# Patient Record
Sex: FEMALE | Race: WHITE | NOT HISPANIC OR LATINO | URBAN - METROPOLITAN AREA
[De-identification: names, ages, dates, MRNs, and addresses within clinical notes are randomized per-mention and may not be internally consistent; named-entity substitution may affect disease eponyms.]

---

## 2023-09-03 PROBLEM — H35.9 RETINA DISORDER: Status: ACTIVE | Noted: 2023-09-03

## 2023-09-03 PROBLEM — E03.9 HYPOTHYROIDISM: Status: ACTIVE | Noted: 2023-09-03

## 2023-09-03 PROBLEM — E88.810 METABOLIC SYNDROME: Status: ACTIVE | Noted: 2023-09-03

## 2023-09-03 PROBLEM — K92.1 BLOOD IN STOOL: Status: ACTIVE | Noted: 2023-09-03

## 2023-09-03 PROBLEM — E55.9 VITAMIN D DEFICIENCY: Status: ACTIVE | Noted: 2023-09-03

## 2023-09-03 PROBLEM — E11.9 NEW ONSET TYPE 2 DIABETES MELLITUS (MULTI): Status: ACTIVE | Noted: 2023-09-03

## 2023-09-03 PROBLEM — C50.912 MALIGNANT NEOPLASM OF UNSPECIFIED SITE OF LEFT FEMALE BREAST (MULTI): Status: ACTIVE | Noted: 2023-09-03

## 2023-09-03 PROBLEM — R10.11 RIGHT UPPER QUADRANT ABDOMINAL PAIN: Status: ACTIVE | Noted: 2023-09-03

## 2023-09-03 PROBLEM — G57.93 NEUROPATHY INVOLVING BOTH LOWER EXTREMITIES: Status: ACTIVE | Noted: 2023-09-03

## 2023-09-03 PROBLEM — R73.9 HYPERGLYCEMIA: Status: ACTIVE | Noted: 2023-09-03

## 2023-09-03 PROBLEM — Z86.0100 HISTORY OF COLONIC POLYPS: Status: ACTIVE | Noted: 2023-09-03

## 2023-09-03 PROBLEM — Z86.010 HISTORY OF COLONIC POLYPS: Status: ACTIVE | Noted: 2023-09-03

## 2023-09-03 PROBLEM — E66.01 MORBID (SEVERE) OBESITY DUE TO EXCESS CALORIES (MULTI): Status: ACTIVE | Noted: 2023-09-03

## 2023-09-03 PROBLEM — N63.20 BREAST MASS, LEFT: Status: ACTIVE | Noted: 2023-09-03

## 2023-09-03 PROBLEM — R30.0 DYSURIA: Status: ACTIVE | Noted: 2023-09-03

## 2023-09-03 RX ORDER — CHOLECALCIFEROL (VITAMIN D3) 50 MCG
1 TABLET ORAL DAILY
COMMUNITY

## 2023-09-03 RX ORDER — LANOLIN ALCOHOL/MO/W.PET/CERES
1 CREAM (GRAM) TOPICAL DAILY
COMMUNITY

## 2023-09-03 RX ORDER — COD LIVER OIL
OIL (ML) ORAL
COMMUNITY

## 2023-09-03 RX ORDER — ASPIRIN 81 MG/1
1 TABLET ORAL 2 TIMES DAILY
COMMUNITY

## 2023-09-03 RX ORDER — PLANT STANOL ESTER 450 MG
TABLET ORAL
COMMUNITY

## 2023-09-03 RX ORDER — PYRIDOXINE HCL (VITAMIN B6) 100 MG
1 TABLET ORAL DAILY
COMMUNITY

## 2023-09-03 RX ORDER — ANASTROZOLE 1 MG/1
1 TABLET ORAL DAILY
COMMUNITY
Start: 2022-01-06

## 2023-09-03 RX ORDER — THYROID 30 MG/1
TABLET ORAL
COMMUNITY

## 2023-09-03 RX ORDER — THYROID 60 MG/1
TABLET ORAL
COMMUNITY

## 2023-09-03 RX ORDER — HYDROCODONE BITARTRATE AND ACETAMINOPHEN 5; 325 MG/1; MG/1
1 TABLET ORAL EVERY 6 HOURS PRN
COMMUNITY

## 2023-12-13 ENCOUNTER — APPOINTMENT (OUTPATIENT)
Dept: PRIMARY CARE | Facility: CLINIC | Age: 63
End: 2023-12-13
Payer: COMMERCIAL

## 2024-01-02 PROBLEM — N63.20 BREAST MASS, LEFT: Status: RESOLVED | Noted: 2023-09-03 | Resolved: 2024-01-02

## 2024-01-02 PROBLEM — R10.11 RIGHT UPPER QUADRANT ABDOMINAL PAIN: Status: RESOLVED | Noted: 2023-09-03 | Resolved: 2024-01-02

## 2024-01-02 PROBLEM — K92.1 BLOOD IN STOOL: Status: RESOLVED | Noted: 2023-09-03 | Resolved: 2024-01-02

## 2024-01-02 PROBLEM — E88.810 METABOLIC SYNDROME: Status: RESOLVED | Noted: 2023-09-03 | Resolved: 2024-01-02

## 2024-01-02 PROBLEM — R73.9 HYPERGLYCEMIA: Status: RESOLVED | Noted: 2023-09-03 | Resolved: 2024-01-02

## 2024-01-02 PROBLEM — R30.0 DYSURIA: Status: RESOLVED | Noted: 2023-09-03 | Resolved: 2024-01-02

## 2024-01-03 ENCOUNTER — APPOINTMENT (OUTPATIENT)
Dept: PRIMARY CARE | Facility: CLINIC | Age: 64
End: 2024-01-03
Payer: COMMERCIAL

## 2024-01-25 ENCOUNTER — APPOINTMENT (OUTPATIENT)
Dept: PRIMARY CARE | Facility: CLINIC | Age: 64
End: 2024-01-25
Payer: COMMERCIAL

## 2025-01-07 ENCOUNTER — LAB (OUTPATIENT)
Dept: LAB | Facility: LAB | Age: 65
End: 2025-01-07
Payer: MEDICARE

## 2025-01-07 DIAGNOSIS — M79.642 PAIN IN LEFT HAND: Primary | ICD-10-CM

## 2025-01-07 LAB
ANION GAP SERPL CALC-SCNC: 16 MMOL/L (ref 10–20)
BUN SERPL-MCNC: 17 MG/DL (ref 6–23)
CALCIUM SERPL-MCNC: 8.8 MG/DL (ref 8.6–10.3)
CHLORIDE SERPL-SCNC: 100 MMOL/L (ref 98–107)
CO2 SERPL-SCNC: 24 MMOL/L (ref 21–32)
CREAT SERPL-MCNC: 0.56 MG/DL (ref 0.5–1.05)
EGFRCR SERPLBLD CKD-EPI 2021: >90 ML/MIN/1.73M*2
GLUCOSE SERPL-MCNC: 265 MG/DL (ref 74–99)
POTASSIUM SERPL-SCNC: 4.4 MMOL/L (ref 3.5–5.3)
SODIUM SERPL-SCNC: 136 MMOL/L (ref 136–145)

## 2025-01-07 PROCEDURE — 80048 BASIC METABOLIC PNL TOTAL CA: CPT

## 2025-01-14 DIAGNOSIS — E03.9 HYPOTHYROIDISM, UNSPECIFIED TYPE: Primary | ICD-10-CM

## 2025-01-14 RX ORDER — THYROID 30 MG/1
30 TABLET ORAL
Qty: 90 TABLET | Refills: 1 | Status: SHIPPED | OUTPATIENT
Start: 2025-01-14

## 2025-01-14 NOTE — TELEPHONE ENCOUNTER
Refill Request: PT IS STATING ARMOUR 60MG IS TOO MUCH OF A DOSE FOR HER.    thyroid, pork, (Tumtum Thyroid) 30 mg tablet 1 tablet on an empty stomach Orally Once a day for 90 day(s)     # 90 DAY SUPPLY    PHARMACY:  [facility]  [address] Phone: [phone]   Fax: [phone]        Next ov-01/17/25, medication follow up.

## 2025-01-17 ENCOUNTER — TELEPHONE (OUTPATIENT)
Dept: PRIMARY CARE | Facility: CLINIC | Age: 65
End: 2025-01-17

## 2025-01-17 ENCOUNTER — OFFICE VISIT (OUTPATIENT)
Dept: PRIMARY CARE | Facility: CLINIC | Age: 65
End: 2025-01-17
Payer: MEDICARE

## 2025-01-17 VITALS
OXYGEN SATURATION: 96 % | BODY MASS INDEX: 46.83 KG/M2 | TEMPERATURE: 98.6 F | DIASTOLIC BLOOD PRESSURE: 74 MMHG | HEART RATE: 79 BPM | SYSTOLIC BLOOD PRESSURE: 128 MMHG | WEIGHT: 239.8 LBS | RESPIRATION RATE: 18 BRPM

## 2025-01-17 DIAGNOSIS — E66.01 MORBID (SEVERE) OBESITY DUE TO EXCESS CALORIES (MULTI): ICD-10-CM

## 2025-01-17 DIAGNOSIS — C50.912 MALIGNANT NEOPLASM OF LEFT BREAST IN FEMALE, ESTROGEN RECEPTOR POSITIVE, UNSPECIFIED SITE OF BREAST: ICD-10-CM

## 2025-01-17 DIAGNOSIS — E03.9 HYPOTHYROIDISM, UNSPECIFIED TYPE: ICD-10-CM

## 2025-01-17 DIAGNOSIS — E55.9 VITAMIN D DEFICIENCY: ICD-10-CM

## 2025-01-17 DIAGNOSIS — Z17.0 MALIGNANT NEOPLASM OF LEFT BREAST IN FEMALE, ESTROGEN RECEPTOR POSITIVE, UNSPECIFIED SITE OF BREAST: ICD-10-CM

## 2025-01-17 DIAGNOSIS — M85.80 OSTEOPENIA DUE TO CANCER THERAPY: ICD-10-CM

## 2025-01-17 DIAGNOSIS — E11.9 TYPE 2 DIABETES MELLITUS TREATED WITHOUT INSULIN (MULTI): Primary | ICD-10-CM

## 2025-01-17 LAB — POC HEMOGLOBIN A1C: 9.8 % (ref 4.2–6.5)

## 2025-01-17 PROCEDURE — 3074F SYST BP LT 130 MM HG: CPT | Performed by: FAMILY MEDICINE

## 2025-01-17 PROCEDURE — 1126F AMNT PAIN NOTED NONE PRSNT: CPT | Performed by: FAMILY MEDICINE

## 2025-01-17 PROCEDURE — 3078F DIAST BP <80 MM HG: CPT | Performed by: FAMILY MEDICINE

## 2025-01-17 PROCEDURE — 99214 OFFICE O/P EST MOD 30 MIN: CPT | Mod: 25 | Performed by: FAMILY MEDICINE

## 2025-01-17 PROCEDURE — 1036F TOBACCO NON-USER: CPT | Performed by: FAMILY MEDICINE

## 2025-01-17 PROCEDURE — 83036 HEMOGLOBIN GLYCOSYLATED A1C: CPT | Mod: QW | Performed by: FAMILY MEDICINE

## 2025-01-17 PROCEDURE — 1159F MED LIST DOCD IN RCRD: CPT | Performed by: FAMILY MEDICINE

## 2025-01-17 PROCEDURE — 1160F RVW MEDS BY RX/DR IN RCRD: CPT | Performed by: FAMILY MEDICINE

## 2025-01-17 PROCEDURE — 99214 OFFICE O/P EST MOD 30 MIN: CPT | Performed by: FAMILY MEDICINE

## 2025-01-17 RX ORDER — METFORMIN HYDROCHLORIDE 500 MG/1
1000 TABLET, EXTENDED RELEASE ORAL
Qty: 200 TABLET | Refills: 1 | Status: SHIPPED | OUTPATIENT
Start: 2025-01-17 | End: 2026-02-21

## 2025-01-17 ASSESSMENT — LIFESTYLE VARIABLES
SKIP TO QUESTIONS 9-10: 1
HOW MANY STANDARD DRINKS CONTAINING ALCOHOL DO YOU HAVE ON A TYPICAL DAY: PATIENT DOES NOT DRINK
AUDIT-C TOTAL SCORE: 0
HOW OFTEN DO YOU HAVE SIX OR MORE DRINKS ON ONE OCCASION: NEVER
HOW OFTEN DO YOU HAVE A DRINK CONTAINING ALCOHOL: NEVER

## 2025-01-17 ASSESSMENT — ENCOUNTER SYMPTOMS
DEPRESSION: 0
LOSS OF SENSATION IN FEET: 0
OCCASIONAL FEELINGS OF UNSTEADINESS: 0

## 2025-01-17 ASSESSMENT — PAIN SCALES - GENERAL: PAINLEVEL_OUTOF10: 0-NO PAIN

## 2025-01-17 ASSESSMENT — PATIENT HEALTH QUESTIONNAIRE - PHQ9
SUM OF ALL RESPONSES TO PHQ9 QUESTIONS 1 & 2: 0
1. LITTLE INTEREST OR PLEASURE IN DOING THINGS: NOT AT ALL
2. FEELING DOWN, DEPRESSED OR HOPELESS: NOT AT ALL

## 2025-01-17 NOTE — PATIENT INSTRUCTIONS
Pt is here for follow up.     For Dm2 - A1c is 9.8 - uncontrolled.  Recommend exercise 30 minutes 3-5 days a week- (walking, jogging biking, swimming resistance training), eating more whole foods (vegetables, fruit, lean proteins), cut out processed foods and added sugar foods.  Recommend starting metformin xr 500mg - use 1 tab a day for 2 weeks, then increase to 2 tabs.  If not controlled we will add either SGLT-2 medication (jardiance, farxiga) or GLP-1 injectable therapy (ozempic/mounjaro)    For thyroid - we will check levels.  Continue armour.      For cholesterol - check levels.     For breast cancer - seeing oncology - up to date on mammogram.     For osteopenia - getting zometa due to arimidex.      Follow up in 3 months.

## 2025-01-17 NOTE — PROGRESS NOTES
Subjective   Patient ID: Mellissa Aguirre is a 64 y.o. female who presents for medication follow up.    Here for follow up.  Patient has been very busy -  lost job and needed aortic valve replacement.      For DM2:  -A1c: 9.8  -Follow up:  Patient has not been able to exercise.  Patient has been unable to work on diet due to stressors.  Glucose worse with eating.   Pt has been getting tenderness in the feet.  Pt was controlling diabetes with her diet.    -Hypoglycemic Episodes: none  -Glucose monitoring:  Checking daily -1-2 times a week    Cancer History:  -Type:  Left breast cancer  -Stage: O3oQ6S5 G2 ER-positive, MS-positive, HER2-negative (low risk Mammaprint) left breast cancer - Dx 2021  -Oncologist: Dr. Sydni Rudolph - [facility]  -F/U: Pt was diagnosed in 2021.  Pt is in remission.  Mammogram yearly currently.  Pt is getting zometa every 6 months.  Pt is on anastrazole - plan is for 5 years.      Hypothyrodism  -Pt has been on armour thyroid    Left thumb pain  -Pt is seeing Dr. Lopez.  Patient has swelling in thumb.  Hx of tumor in the thumb.  Patient is having more discomfort.  Xray neg.  MRI ordered.  Pt has mass on left thumb.          Review of Systems    Objective   Pulse 79   Temp 37 °C (98.6 °F) (Temporal)   Resp 18   Wt 109 kg (239 lb 12.8 oz)   SpO2 96%   BMI 46.83 kg/m²     Physical Exam  Vitals reviewed.   Constitutional:       General: She is not in acute distress.     Appearance: She is obese.   Cardiovascular:      Rate and Rhythm: Normal rate and regular rhythm.   Pulmonary:      Effort: Pulmonary effort is normal.      Breath sounds: No wheezing or rhonchi.   Musculoskeletal:      Right lower leg: No edema.      Left lower leg: No edema.      Comments: Left thumb swelling   Lymphadenopathy:      Cervical: No cervical adenopathy.   Neurological:      Mental Status: She is alert.         Assessment/Plan   Diagnoses and all orders for this visit:  Type 2 diabetes mellitus treated  without insulin (Multi)  -     POCT glycosylated hemoglobin (Hb A1C) manually resulted  Malignant neoplasm of left breast in female, estrogen receptor positive, unspecified site of breast  Morbid (severe) obesity due to excess calories (Multi)         Patient Instructions   Pt is here for follow up.     For Dm2 - A1c is 9.8 - uncontrolled.  Recommend exercise 30 minutes 3-5 days a week- (walking, jogging biking, swimming resistance training), eating more whole foods (vegetables, fruit, lean proteins), cut out processed foods and added sugar foods.  Recommend starting metformin xr 500mg - use 1 tab a day for 2 weeks, then increase to 2 tabs.  If not controleld we will add either SGLT-2 medication (jardiance, farxiga) or GLP-1 injectable therapy (ozempic/mounjaro)    For thyroid - we will check levels.  Continue armour.      For cholesterol - check levels.     For breast cancer - seeing oncology - up to date on mammogram.     For osteopenia - getting zometa due to arimidex.      Follow up in 3 months.

## 2025-01-20 ENCOUNTER — LAB (OUTPATIENT)
Dept: LAB | Facility: LAB | Age: 65
End: 2025-01-20
Payer: MEDICARE

## 2025-01-20 DIAGNOSIS — E03.9 HYPOTHYROIDISM, UNSPECIFIED TYPE: ICD-10-CM

## 2025-01-20 DIAGNOSIS — E11.9 TYPE 2 DIABETES MELLITUS TREATED WITHOUT INSULIN (MULTI): ICD-10-CM

## 2025-01-20 DIAGNOSIS — E55.9 VITAMIN D DEFICIENCY: ICD-10-CM

## 2025-01-20 LAB
25(OH)D3 SERPL-MCNC: 31 NG/ML (ref 30–100)
ALBUMIN SERPL BCP-MCNC: 4.3 G/DL (ref 3.4–5)
ALP SERPL-CCNC: 93 U/L (ref 33–136)
ALT SERPL W P-5'-P-CCNC: 38 U/L (ref 7–45)
ANION GAP SERPL CALC-SCNC: 17 MMOL/L (ref 10–20)
AST SERPL W P-5'-P-CCNC: 27 U/L (ref 9–39)
BASOPHILS # BLD AUTO: 0.07 X10*3/UL (ref 0–0.1)
BASOPHILS NFR BLD AUTO: 0.9 %
BILIRUB SERPL-MCNC: 0.6 MG/DL (ref 0–1.2)
BUN SERPL-MCNC: 16 MG/DL (ref 6–23)
CALCIUM SERPL-MCNC: 9.5 MG/DL (ref 8.6–10.3)
CHLORIDE SERPL-SCNC: 95 MMOL/L (ref 98–107)
CHOLEST SERPL-MCNC: 229 MG/DL (ref 0–199)
CHOLESTEROL/HDL RATIO: 3.8
CO2 SERPL-SCNC: 26 MMOL/L (ref 21–32)
CREAT SERPL-MCNC: 0.64 MG/DL (ref 0.5–1.05)
CREAT UR-MCNC: 88 MG/DL (ref 20–320)
EGFRCR SERPLBLD CKD-EPI 2021: >90 ML/MIN/1.73M*2
EOSINOPHIL # BLD AUTO: 0.41 X10*3/UL (ref 0–0.7)
EOSINOPHIL NFR BLD AUTO: 5.5 %
ERYTHROCYTE [DISTWIDTH] IN BLOOD BY AUTOMATED COUNT: 13.2 % (ref 11.5–14.5)
GLUCOSE SERPL-MCNC: 254 MG/DL (ref 74–99)
HCT VFR BLD AUTO: 46.2 % (ref 36–46)
HDLC SERPL-MCNC: 59.6 MG/DL
HGB BLD-MCNC: 15 G/DL (ref 12–16)
IMM GRANULOCYTES # BLD AUTO: 0.03 X10*3/UL (ref 0–0.7)
IMM GRANULOCYTES NFR BLD AUTO: 0.4 % (ref 0–0.9)
LDLC SERPL CALC-MCNC: 122 MG/DL
LYMPHOCYTES # BLD AUTO: 2.02 X10*3/UL (ref 1.2–4.8)
LYMPHOCYTES NFR BLD AUTO: 27.1 %
MCH RBC QN AUTO: 28.6 PG (ref 26–34)
MCHC RBC AUTO-ENTMCNC: 32.5 G/DL (ref 32–36)
MCV RBC AUTO: 88 FL (ref 80–100)
MICROALBUMIN UR-MCNC: 18.2 MG/L
MICROALBUMIN/CREAT UR: 20.7 UG/MG CREAT
MONOCYTES # BLD AUTO: 0.49 X10*3/UL (ref 0.1–1)
MONOCYTES NFR BLD AUTO: 6.6 %
NEUTROPHILS # BLD AUTO: 4.44 X10*3/UL (ref 1.2–7.7)
NEUTROPHILS NFR BLD AUTO: 59.5 %
NON HDL CHOLESTEROL: 169 MG/DL (ref 0–149)
NRBC BLD-RTO: 0 /100 WBCS (ref 0–0)
PLATELET # BLD AUTO: 224 X10*3/UL (ref 150–450)
POTASSIUM SERPL-SCNC: 4.3 MMOL/L (ref 3.5–5.3)
PROT SERPL-MCNC: 6.8 G/DL (ref 6.4–8.2)
RBC # BLD AUTO: 5.25 X10*6/UL (ref 4–5.2)
SODIUM SERPL-SCNC: 134 MMOL/L (ref 136–145)
T4 FREE SERPL-MCNC: 0.83 NG/DL (ref 0.61–1.12)
TRIGL SERPL-MCNC: 238 MG/DL (ref 0–149)
TSH SERPL-ACNC: 12.27 MIU/L (ref 0.44–3.98)
VLDL: 48 MG/DL (ref 0–40)
WBC # BLD AUTO: 7.5 X10*3/UL (ref 4.4–11.3)

## 2025-01-20 PROCEDURE — 84443 ASSAY THYROID STIM HORMONE: CPT

## 2025-01-20 PROCEDURE — 80061 LIPID PANEL: CPT

## 2025-01-20 PROCEDURE — 80053 COMPREHEN METABOLIC PANEL: CPT

## 2025-01-20 PROCEDURE — 82570 ASSAY OF URINE CREATININE: CPT

## 2025-01-20 PROCEDURE — 84439 ASSAY OF FREE THYROXINE: CPT

## 2025-01-20 PROCEDURE — 82043 UR ALBUMIN QUANTITATIVE: CPT

## 2025-01-20 PROCEDURE — 82306 VITAMIN D 25 HYDROXY: CPT

## 2025-01-20 PROCEDURE — 85025 COMPLETE CBC W/AUTO DIFF WBC: CPT

## 2025-01-21 DIAGNOSIS — E03.9 HYPOTHYROIDISM, UNSPECIFIED TYPE: ICD-10-CM

## 2025-01-21 DIAGNOSIS — E11.9 TYPE 2 DIABETES MELLITUS TREATED WITHOUT INSULIN (MULTI): Primary | ICD-10-CM

## 2025-01-21 DIAGNOSIS — E78.2 MIXED HYPERLIPIDEMIA: ICD-10-CM

## 2025-01-21 RX ORDER — THYROID 15 MG/1
15 TABLET ORAL
Qty: 90 TABLET | Refills: 1 | Status: SHIPPED | OUTPATIENT
Start: 2025-01-21 | End: 2026-01-21

## 2025-01-21 RX ORDER — ROSUVASTATIN CALCIUM 10 MG/1
10 TABLET, COATED ORAL DAILY
Qty: 90 TABLET | Refills: 3 | Status: SHIPPED | OUTPATIENT
Start: 2025-01-21 | End: 2026-01-21

## 2025-02-05 ENCOUNTER — APPOINTMENT (OUTPATIENT)
Facility: HOSPITAL | Age: 65
End: 2025-02-05
Payer: MEDICARE

## 2025-02-19 ENCOUNTER — ANCILLARY PROCEDURE (OUTPATIENT)
Facility: HOSPITAL | Age: 65
End: 2025-02-19
Payer: MEDICARE

## 2025-02-19 DIAGNOSIS — M79.642 PAIN IN LEFT HAND: ICD-10-CM

## 2025-02-19 PROCEDURE — 73220 MRI UPPR EXTREMITY W/O&W/DYE: CPT | Mod: LEFT SIDE | Performed by: STUDENT IN AN ORGANIZED HEALTH CARE EDUCATION/TRAINING PROGRAM

## 2025-02-19 PROCEDURE — A9575 INJ GADOTERATE MEGLUMI 0.1ML: HCPCS | Performed by: ORTHOPAEDIC SURGERY

## 2025-02-19 PROCEDURE — 2550000001 HC RX 255 CONTRASTS: Performed by: ORTHOPAEDIC SURGERY

## 2025-02-19 PROCEDURE — 73220 MRI UPPR EXTREMITY W/O&W/DYE: CPT | Mod: LT

## 2025-02-19 RX ORDER — GADOTERATE MEGLUMINE 376.9 MG/ML
20 INJECTION INTRAVENOUS
Status: COMPLETED | OUTPATIENT
Start: 2025-02-19 | End: 2025-02-19

## 2025-02-19 RX ADMIN — GADOTERATE MEGLUMINE 20 ML: 376.9 INJECTION INTRAVENOUS at 14:31

## 2025-03-07 ENCOUNTER — APPOINTMENT (OUTPATIENT)
Dept: RADIOLOGY | Facility: HOSPITAL | Age: 65
End: 2025-03-07
Payer: MEDICARE

## 2025-03-07 ENCOUNTER — HOSPITAL ENCOUNTER (EMERGENCY)
Facility: HOSPITAL | Age: 65
Discharge: HOME | End: 2025-03-07
Attending: STUDENT IN AN ORGANIZED HEALTH CARE EDUCATION/TRAINING PROGRAM
Payer: MEDICARE

## 2025-03-07 VITALS
DIASTOLIC BLOOD PRESSURE: 80 MMHG | HEART RATE: 81 BPM | RESPIRATION RATE: 18 BRPM | SYSTOLIC BLOOD PRESSURE: 169 MMHG | TEMPERATURE: 98.1 F | WEIGHT: 250 LBS | BODY MASS INDEX: 42.68 KG/M2 | HEIGHT: 64 IN | OXYGEN SATURATION: 95 %

## 2025-03-07 DIAGNOSIS — S16.1XXA CERVICAL STRAIN, ACUTE, INITIAL ENCOUNTER: Primary | ICD-10-CM
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
## 2025-03-07 LAB
ABO GROUP (TYPE) IN BLOOD: NORMAL
ALBUMIN SERPL BCP-MCNC: 4 G/DL (ref 3.4–5)
ALP SERPL-CCNC: 91 U/L (ref 33–136)
ALT SERPL W P-5'-P-CCNC: 64 U/L (ref 7–45)
ANION GAP SERPL CALC-SCNC: 11 MMOL/L (ref 10–20)
ANTIBODY SCREEN: NORMAL
AST SERPL W P-5'-P-CCNC: 55 U/L (ref 9–39)
BASOPHILS # BLD AUTO: 0.07 X10*3/UL (ref 0–0.1)
BASOPHILS NFR BLD AUTO: 0.9 %
BILIRUB SERPL-MCNC: 0.4 MG/DL (ref 0–1.2)
BUN SERPL-MCNC: 18 MG/DL (ref 6–23)
CALCIUM SERPL-MCNC: 8.7 MG/DL (ref 8.6–10.3)
CHLORIDE SERPL-SCNC: 102 MMOL/L (ref 98–107)
CO2 SERPL-SCNC: 23 MMOL/L (ref 21–32)
CREAT SERPL-MCNC: 0.65 MG/DL (ref 0.5–1.05)
EGFRCR SERPLBLD CKD-EPI 2021: >90 ML/MIN/1.73M*2
EOSINOPHIL # BLD AUTO: 0.36 X10*3/UL (ref 0–0.7)
EOSINOPHIL NFR BLD AUTO: 4.5 %
ERYTHROCYTE [DISTWIDTH] IN BLOOD BY AUTOMATED COUNT: 13.2 % (ref 11.5–14.5)
ETHANOL SERPL-MCNC: <10 MG/DL
GLUCOSE SERPL-MCNC: 198 MG/DL (ref 74–99)
HCT VFR BLD AUTO: 40.9 % (ref 36–46)
HGB BLD-MCNC: 13.9 G/DL (ref 12–16)
IMM GRANULOCYTES # BLD AUTO: 0.03 X10*3/UL (ref 0–0.7)
IMM GRANULOCYTES NFR BLD AUTO: 0.4 % (ref 0–0.9)
INR PPP: 1 (ref 0.9–1.1)
LACTATE SERPL-SCNC: 2.4 MMOL/L (ref 0.4–2)
LYMPHOCYTES # BLD AUTO: 1.77 X10*3/UL (ref 1.2–4.8)
LYMPHOCYTES NFR BLD AUTO: 22.1 %
MCH RBC QN AUTO: 28.7 PG (ref 26–34)
MCHC RBC AUTO-ENTMCNC: 34 G/DL (ref 32–36)
MCV RBC AUTO: 84 FL (ref 80–100)
MONOCYTES # BLD AUTO: 0.55 X10*3/UL (ref 0.1–1)
MONOCYTES NFR BLD AUTO: 6.9 %
NEUTROPHILS # BLD AUTO: 5.24 X10*3/UL (ref 1.2–7.7)
NEUTROPHILS NFR BLD AUTO: 65.2 %
NRBC BLD-RTO: 0 /100 WBCS (ref 0–0)
PLATELET # BLD AUTO: 189 X10*3/UL (ref 150–450)
POTASSIUM SERPL-SCNC: 3.7 MMOL/L (ref 3.5–5.3)
PROT SERPL-MCNC: 6.4 G/DL (ref 6.4–8.2)
PROTHROMBIN TIME: 11.5 SECONDS (ref 9.8–12.4)
RBC # BLD AUTO: 4.85 X10*6/UL (ref 4–5.2)
RH FACTOR (ANTIGEN D): NORMAL
SODIUM SERPL-SCNC: 132 MMOL/L (ref 136–145)
WBC # BLD AUTO: 8 X10*3/UL (ref 4.4–11.3)

## 2025-03-07 PROCEDURE — 70450 CT HEAD/BRAIN W/O DYE: CPT

## 2025-03-07 PROCEDURE — 99291 CRITICAL CARE FIRST HOUR: CPT | Performed by: PHYSICIAN ASSISTANT

## 2025-03-07 PROCEDURE — 2550000001 HC RX 255 CONTRASTS: Performed by: STUDENT IN AN ORGANIZED HEALTH CARE EDUCATION/TRAINING PROGRAM

## 2025-03-07 PROCEDURE — 74177 CT ABD & PELVIS W/CONTRAST: CPT | Performed by: STUDENT IN AN ORGANIZED HEALTH CARE EDUCATION/TRAINING PROGRAM

## 2025-03-07 PROCEDURE — 80053 COMPREHEN METABOLIC PANEL: CPT | Performed by: PHYSICIAN ASSISTANT

## 2025-03-07 PROCEDURE — 86900 BLOOD TYPING SEROLOGIC ABO: CPT | Performed by: PHYSICIAN ASSISTANT

## 2025-03-07 PROCEDURE — 82077 ASSAY SPEC XCP UR&BREATH IA: CPT | Performed by: PHYSICIAN ASSISTANT

## 2025-03-07 PROCEDURE — 72131 CT LUMBAR SPINE W/O DYE: CPT | Performed by: STUDENT IN AN ORGANIZED HEALTH CARE EDUCATION/TRAINING PROGRAM

## 2025-03-07 PROCEDURE — 72128 CT CHEST SPINE W/O DYE: CPT

## 2025-03-07 PROCEDURE — 36415 COLL VENOUS BLD VENIPUNCTURE: CPT | Performed by: PHYSICIAN ASSISTANT

## 2025-03-07 PROCEDURE — 74177 CT ABD & PELVIS W/CONTRAST: CPT

## 2025-03-07 PROCEDURE — 85025 COMPLETE CBC W/AUTO DIFF WBC: CPT | Performed by: PHYSICIAN ASSISTANT

## 2025-03-07 PROCEDURE — 86901 BLOOD TYPING SEROLOGIC RH(D): CPT | Performed by: PHYSICIAN ASSISTANT

## 2025-03-07 PROCEDURE — G0390 TRAUMA RESPONS W/HOSP CRITI: HCPCS

## 2025-03-07 PROCEDURE — 72131 CT LUMBAR SPINE W/O DYE: CPT

## 2025-03-07 PROCEDURE — 85610 PROTHROMBIN TIME: CPT | Performed by: PHYSICIAN ASSISTANT

## 2025-03-07 PROCEDURE — 72128 CT CHEST SPINE W/O DYE: CPT | Performed by: STUDENT IN AN ORGANIZED HEALTH CARE EDUCATION/TRAINING PROGRAM

## 2025-03-07 PROCEDURE — 96360 HYDRATION IV INFUSION INIT: CPT | Mod: 59

## 2025-03-07 PROCEDURE — 72125 CT NECK SPINE W/O DYE: CPT | Performed by: STUDENT IN AN ORGANIZED HEALTH CARE EDUCATION/TRAINING PROGRAM

## 2025-03-07 PROCEDURE — 72125 CT NECK SPINE W/O DYE: CPT

## 2025-03-07 PROCEDURE — 83605 ASSAY OF LACTIC ACID: CPT | Performed by: PHYSICIAN ASSISTANT

## 2025-03-07 PROCEDURE — 2500000004 HC RX 250 GENERAL PHARMACY W/ HCPCS (ALT 636 FOR OP/ED): Performed by: STUDENT IN AN ORGANIZED HEALTH CARE EDUCATION/TRAINING PROGRAM

## 2025-03-07 PROCEDURE — 71260 CT THORAX DX C+: CPT | Performed by: STUDENT IN AN ORGANIZED HEALTH CARE EDUCATION/TRAINING PROGRAM

## 2025-03-07 PROCEDURE — 99285 EMERGENCY DEPT VISIT HI MDM: CPT | Mod: 25

## 2025-03-07 PROCEDURE — 70450 CT HEAD/BRAIN W/O DYE: CPT | Performed by: STUDENT IN AN ORGANIZED HEALTH CARE EDUCATION/TRAINING PROGRAM

## 2025-03-07 RX ORDER — TIZANIDINE 2 MG/1
2 TABLET ORAL EVERY 6 HOURS PRN
Qty: 30 TABLET | Refills: 0 | Status: SHIPPED | OUTPATIENT
Start: 2025-03-07 | End: 2025-03-17

## 2025-03-07 RX ADMIN — IOHEXOL 100 ML: 350 INJECTION, SOLUTION INTRAVENOUS at 19:27

## 2025-03-07 RX ADMIN — SODIUM CHLORIDE 500 ML: 0.9 INJECTION, SOLUTION INTRAVENOUS at 20:24

## 2025-03-07 ASSESSMENT — PAIN DESCRIPTION - LOCATION: LOCATION: NECK

## 2025-03-07 ASSESSMENT — COLUMBIA-SUICIDE SEVERITY RATING SCALE - C-SSRS
2. HAVE YOU ACTUALLY HAD ANY THOUGHTS OF KILLING YOURSELF?: NO
1. IN THE PAST MONTH, HAVE YOU WISHED YOU WERE DEAD OR WISHED YOU COULD GO TO SLEEP AND NOT WAKE UP?: NO
6. HAVE YOU EVER DONE ANYTHING, STARTED TO DO ANYTHING, OR PREPARED TO DO ANYTHING TO END YOUR LIFE?: NO

## 2025-03-07 ASSESSMENT — LIFESTYLE VARIABLES
HAVE YOU EVER FELT YOU SHOULD CUT DOWN ON YOUR DRINKING: NO
EVER FELT BAD OR GUILTY ABOUT YOUR DRINKING: NO
HAVE PEOPLE ANNOYED YOU BY CRITICIZING YOUR DRINKING: NO
TOTAL SCORE: 0
EVER HAD A DRINK FIRST THING IN THE MORNING TO STEADY YOUR NERVES TO GET RID OF A HANGOVER: NO

## 2025-03-07 ASSESSMENT — PAIN - FUNCTIONAL ASSESSMENT: PAIN_FUNCTIONAL_ASSESSMENT: 0-10

## 2025-03-07 ASSESSMENT — PAIN SCALES - GENERAL: PAINLEVEL_OUTOF10: 5 - MODERATE PAIN
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
## 2025-04-24 ENCOUNTER — OFFICE VISIT (OUTPATIENT)
Dept: PRIMARY CARE | Facility: CLINIC | Age: 65
End: 2025-04-24
Payer: MEDICARE

## 2025-04-24 VITALS
SYSTOLIC BLOOD PRESSURE: 146 MMHG | DIASTOLIC BLOOD PRESSURE: 84 MMHG | BODY MASS INDEX: 40.37 KG/M2 | WEIGHT: 235.2 LBS | OXYGEN SATURATION: 97 % | HEART RATE: 79 BPM | TEMPERATURE: 97.4 F

## 2025-04-24 DIAGNOSIS — E66.01 MORBID (SEVERE) OBESITY DUE TO EXCESS CALORIES (MULTI): ICD-10-CM

## 2025-04-24 DIAGNOSIS — E03.9 HYPOTHYROIDISM, UNSPECIFIED TYPE: ICD-10-CM

## 2025-04-24 DIAGNOSIS — Z17.0 MALIGNANT NEOPLASM OF LEFT BREAST IN FEMALE, ESTROGEN RECEPTOR POSITIVE, UNSPECIFIED SITE OF BREAST: ICD-10-CM

## 2025-04-24 DIAGNOSIS — C50.912 MALIGNANT NEOPLASM OF LEFT BREAST IN FEMALE, ESTROGEN RECEPTOR POSITIVE, UNSPECIFIED SITE OF BREAST: ICD-10-CM

## 2025-04-24 DIAGNOSIS — E11.9 TYPE 2 DIABETES MELLITUS TREATED WITHOUT INSULIN (MULTI): Primary | ICD-10-CM

## 2025-04-24 LAB
CHOLEST SERPL-MCNC: 219 MG/DL
CHOLEST/HDLC SERPL: 3.8 (CALC)
EST. AVERAGE GLUCOSE BLD GHB EST-MCNC: 275 MG/DL
EST. AVERAGE GLUCOSE BLD GHB EST-SCNC: 15.2 MMOL/L
HBA1C MFR BLD: 11.2 %
HDLC SERPL-MCNC: 57 MG/DL
LDLC SERPL CALC-MCNC: 118 MG/DL (CALC)
NONHDLC SERPL-MCNC: 162 MG/DL (CALC)
TRIGL SERPL-MCNC: 284 MG/DL
TSH SERPL-ACNC: 4.97 MIU/L (ref 0.4–4.5)

## 2025-04-24 PROCEDURE — 1158F ADVNC CARE PLAN TLK DOCD: CPT | Performed by: FAMILY MEDICINE

## 2025-04-24 PROCEDURE — 3049F LDL-C 100-129 MG/DL: CPT | Performed by: FAMILY MEDICINE

## 2025-04-24 PROCEDURE — 3077F SYST BP >= 140 MM HG: CPT | Performed by: FAMILY MEDICINE

## 2025-04-24 PROCEDURE — 3061F NEG MICROALBUMINURIA REV: CPT | Performed by: FAMILY MEDICINE

## 2025-04-24 PROCEDURE — 1159F MED LIST DOCD IN RCRD: CPT | Performed by: FAMILY MEDICINE

## 2025-04-24 PROCEDURE — G2211 COMPLEX E/M VISIT ADD ON: HCPCS | Performed by: FAMILY MEDICINE

## 2025-04-24 PROCEDURE — 99214 OFFICE O/P EST MOD 30 MIN: CPT | Performed by: FAMILY MEDICINE

## 2025-04-24 PROCEDURE — 3079F DIAST BP 80-89 MM HG: CPT | Performed by: FAMILY MEDICINE

## 2025-04-24 PROCEDURE — 1036F TOBACCO NON-USER: CPT | Performed by: FAMILY MEDICINE

## 2025-04-24 PROCEDURE — 1123F ACP DISCUSS/DSCN MKR DOCD: CPT | Performed by: FAMILY MEDICINE

## 2025-04-24 PROCEDURE — 3046F HEMOGLOBIN A1C LEVEL >9.0%: CPT | Performed by: FAMILY MEDICINE

## 2025-04-24 PROCEDURE — 1160F RVW MEDS BY RX/DR IN RCRD: CPT | Performed by: FAMILY MEDICINE

## 2025-04-24 PROCEDURE — 1126F AMNT PAIN NOTED NONE PRSNT: CPT | Performed by: FAMILY MEDICINE

## 2025-04-24 RX ORDER — DULAGLUTIDE 0.75 MG/.5ML
0.75 INJECTION, SOLUTION SUBCUTANEOUS
Qty: 2 ML | Refills: 0 | Status: SHIPPED | OUTPATIENT
Start: 2025-04-27

## 2025-04-24 ASSESSMENT — LIFESTYLE VARIABLES
HOW OFTEN DO YOU HAVE SIX OR MORE DRINKS ON ONE OCCASION: NEVER
AUDIT-C TOTAL SCORE: 0
HOW OFTEN DO YOU HAVE A DRINK CONTAINING ALCOHOL: NEVER
HOW MANY STANDARD DRINKS CONTAINING ALCOHOL DO YOU HAVE ON A TYPICAL DAY: PATIENT DOES NOT DRINK
SKIP TO QUESTIONS 9-10: 1

## 2025-04-24 ASSESSMENT — ENCOUNTER SYMPTOMS
OCCASIONAL FEELINGS OF UNSTEADINESS: 0
DEPRESSION: 0
LOSS OF SENSATION IN FEET: 0

## 2025-04-24 ASSESSMENT — PATIENT HEALTH QUESTIONNAIRE - PHQ9
1. LITTLE INTEREST OR PLEASURE IN DOING THINGS: NOT AT ALL
SUM OF ALL RESPONSES TO PHQ9 QUESTIONS 1 & 2: 0
2. FEELING DOWN, DEPRESSED OR HOPELESS: NOT AT ALL

## 2025-04-24 ASSESSMENT — PAIN SCALES - GENERAL: PAINLEVEL_OUTOF10: 0-NO PAIN

## 2025-04-24 NOTE — PROGRESS NOTES
Subjective   Patient ID: Mellissa Aguirre is a 64 y.o. female who presents for Diabetes.    Patient is here for follow up.     For DM2:  -A1c: 11.2  -Follow up:  Pt tried metformin - pt developed pain in the low back, muscle aches - could not tolerate the double dose - pt has restarted single dose.   -Past Medications: metformin.   -Hypoglycemic Episodes: none  -Glucose monitoring:  Checking daily -1-2 times a week    Cancer History:  -Type:  Left breast cancer  -Stage: V6gF2G5 G2 ER-positive, AK-positive, HER2-negative (low risk Mammaprint) left breast cancer - Dx 2021  -Oncologist: Dr. Sydni Rudolph - [facility]  -F/U: Pt was diagnosed in 2021.  Pt is in remission.  Mammogram yearly currently.  Pt is getting zometa every 6 months.  Pt is on anastrazole - plan is for 5 years.      Hypothyrodism  -Pt has been on armour thyroid    Dyslipidemia  -F/U: Cholesterol is elevated.  Not taking crestor.  Elevated LDL.   -Compliance with treatment thus far has been poor.         Diabetes         Review of Systems    Objective   /84 (BP Location: Right arm, Patient Position: Sitting)   Pulse 79   Temp 36.3 °C (97.4 °F) (Temporal)   Wt 107 kg (235 lb 3.2 oz)   SpO2 97%   BMI 40.37 kg/m²     Physical Exam  Vitals reviewed.   Constitutional:       General: She is not in acute distress.     Appearance: She is obese.   Cardiovascular:      Rate and Rhythm: Normal rate and regular rhythm.   Pulmonary:      Effort: Pulmonary effort is normal.      Breath sounds: No wheezing or rhonchi.   Musculoskeletal:      Right lower leg: No edema.      Left lower leg: No edema.   Lymphadenopathy:      Cervical: No cervical adenopathy.   Neurological:      Mental Status: She is alert.         Assessment/Plan   Diagnoses and all orders for this visit:  Type 2 diabetes mellitus treated without insulin (Multi)  Morbid (severe) obesity due to excess calories (Multi)  Malignant neoplasm of left breast in female, estrogen receptor positive,  unspecified site of breast  Hypothyroidism, unspecified type    Patient Instructions   Here for follow up.     For Diabetes - uncontrolled - A1c is > 11 - goal is < 7%.  Pt had issues with 2 tabs of metformin.  Recommend metformin once a day.   Discussed GLP-1 therapy - recommend trulicity once a week.  Start 0.75mg once a week for 4 weeks, then increase to 1.5mg.  Call if issues.      For thyroid - continue current regimn - we will recheck in 3 months    For cholesterol medication - start crestor once a day.  Recheck in 3 months    Follow up in 3 months.

## 2025-04-24 NOTE — PATIENT INSTRUCTIONS
Here for follow up.     For Diabetes - uncontrolled - A1c is > 11 - goal is < 7%.  Pt had issues with 2 tabs of metformin.  Recommend metformin once a day.   Discussed GLP-1 therapy - recommend trulicity once a week.  Start 0.75mg once a week for 4 weeks, then increase to 1.5mg.  Call if issues.      For thyroid - continue current regimen - we will recheck in 3 months    For cholesterol medication - start crestor once a day.  Recheck in 3 months    Follow up in 3 months.

## 2025-05-01 ENCOUNTER — TELEMEDICINE (OUTPATIENT)
Dept: PHARMACY | Facility: HOSPITAL | Age: 65
End: 2025-05-01
Payer: MEDICARE

## 2025-05-01 DIAGNOSIS — E11.9 TYPE 2 DIABETES MELLITUS TREATED WITHOUT INSULIN (MULTI): ICD-10-CM

## 2025-05-01 NOTE — Clinical Note
Patient did not  Trulicity due to price. Will  and start over the weekend, I will call in a few weeks to help titrate.

## 2025-05-01 NOTE — ASSESSMENT & PLAN NOTE
Patient's goal A1c is < 7%.  Is pt at goal? No, 11.2  Patient's SMBGs are over 200 per patient. Did not  Trulicity due to price. Is willing to  and start this Sunday.  Rationale for plan: Start Trulicity.    Medication Changes:  CONTINUE  Metformin XR 1000 mg once daily  START  Trulicity 0.75 mg once weekly

## 2025-05-01 NOTE — PROGRESS NOTES
"  Clinical Pharmacy Appointment    Patient ID: Mellissa Aguirre is a 64 y.o. female who presents for Diabetes.    Pt is here for First appointment.     Referring Provider/PCP: Hao Benz DO  Last visit with PCP: 4/24/25   Next visit with PCP: 7/25/25    Subjective   Medication Reconciliation:  Changed:   Added:   Discontinued:     Drug Interactions  No relevant drug interactions were noted.    Medication System Management  Patient's preferred pharmacy: [facility]  Adherence/Organization: No issues reported  Affordability/Accessibility: Trulicity $231- Income reported >400% (At least $120,000)    HPI  DIABETES MELLITUS TYPE II:    Diagnosed with diabetes: At least 2019.   Known diabetic complications: None.  Does patient follow with Endocrinology: No  Last optometry exam: 3 years ago  Most recent visit in Podiatry: PCP does     Current diabetic medications include:  Metformin XR 1000 mg once daily    Clarifications to above regimen: Didn't  Trulicity due to price  Adverse Effects: None    Past diabetic medications include:  None    Glucose Readings:  Glucometer/CGM Type: Glucometer  Patient tests BG 1 times per day    Current home BG readings (mg/dL): around 200 fasting, Afternoon 150-180   Previous home BG readings (mg/dL): N/A    Any episodes of hypoglycemia? No,  .  Did patient treat episode of hypoglycemia appropriately? N/A  Does the patient have a prescription for ready-to-use Glucagon? Not on insulin    Lifestyle:  Diet: unknown meals/day.  BK: Not discussed  LN: Not discussed  DN: Not discussed  Snacks: Not discussed  Drinks: \"Im not thirsty, so I don't drink\" 2 bottles of water per day  Exercise: does not exercise  Used to swim but stopped. Just joined  Is limited by: life  Tobacco history: Never    Secondary Prevention:  Statin? Yes  ACE-I/ARB? No  Aspirin? No    Pertinent PMH Review:  PMH of Pancreatitis: No  PMH of Retinopathy: No  PMH of Urinary Tract Infections: No  PMH of MTC: No  PMH of " MEN2: No  UACR/EGFR in last year?: Yes  Albumin/Creatinine Ratio   Date Value Ref Range Status   01/20/2025 20.7 <30.0 ug/mg Creat Final       Immunizations:  Influenza? No  COVID? No  Pneumonia? No  Shingles? No  Hepatitis B? No     Objective   Allergies[1]  Social History     Social History Narrative    Not on file      Medication Review  Current Outpatient Medications   Medication Instructions    anastrozole (Arimidex) 1 mg tablet 1 tablet, Daily    cholecalciferol (Vitamin D-3) 50 MCG (2000 UT) tablet 1 tablet, Daily    cod liver oiL oil Take by mouth.    cyanocobalamin (Vitamin B-12) 1,000 mcg tablet 1 tablet, Daily    metFORMIN XR (GLUCOPHAGE-XR) 1,000 mg, oral, Daily with breakfast, Do not crush, chew, or split.    rosuvastatin (CRESTOR) 10 mg, oral, Daily    thyroid (pork) (ARMOUR THYROID) 30 mg, oral, Daily before breakfast    thyroid (pork) (ARMOUR THYROID) 15 mg, oral, Daily before breakfast    Trulicity 0.75 mg, subcutaneous, Once Weekly    zoledronic acid (ZOMETA IV) Infuse into a venous catheter.      Vitals  BP Readings from Last 2 Encounters:   04/24/25 146/84   03/07/25 169/80     BMI Readings from Last 1 Encounters:   04/24/25 40.37 kg/m²      Labs  A1C  Lab Results   Component Value Date    HGBA1C 11.2 (H) 04/23/2025    HGBA1C 9.8 (A) 01/17/2025    HGBA1C 6.5 (H) 09/10/2020     BMP  Lab Results   Component Value Date    CALCIUM 8.7 03/07/2025     (L) 03/07/2025    K 3.7 03/07/2025    CO2 23 03/07/2025     03/07/2025    BUN 18 03/07/2025    CREATININE 0.65 03/07/2025    EGFR >90 03/07/2025     LFTs  Lab Results   Component Value Date    ALT 64 (H) 03/07/2025    AST 55 (H) 03/07/2025    ALKPHOS 91 03/07/2025    BILITOT 0.4 03/07/2025     FLP  Lab Results   Component Value Date    TRIG 284 (H) 04/23/2025    CHOL 219 (H) 04/23/2025    LDLCALC 118 (H) 04/23/2025    HDL 57 04/23/2025     Urine Microalbumin  Lab Results   Component Value Date    MICROALBCREA 20.7 01/20/2025     Weight  Management  Wt Readings from Last 3 Encounters:   04/24/25 107 kg (235 lb 3.2 oz)   03/07/25 113 kg (250 lb)   01/17/25 109 kg (239 lb 12.8 oz)      There is no height or weight on file to calculate BMI.     Assessment/Plan   Problem List Items Addressed This Visit       Type 2 diabetes mellitus treated without insulin (Multi)    Patient's goal A1c is < 7%.  Is pt at goal? No, 11.2  Patient's SMBGs are over 200 per patient. Did not  Trulicity due to price. Is willing to  and start this Sunday.  Rationale for plan: Start Trulicity.    Medication Changes:  CONTINUE  Metformin XR 1000 mg once daily  START  Trulicity 0.75 mg once weekly         Relevant Orders    Referral to Clinical Pharmacy       Clinical Pharmacist follow-up: 5/22 at 11 am, Telehealth visit    Patient is not followed in [facility].    Continue all meds under the continuation of care with the referring provider and clinical pharmacy team.    Thank you,  Tamara Ramirez  Clinical Pharmacy Specialist, Primary Care  Phone: [phone]  Fax: [phone]    Verbal consent to manage patient's drug therapy was obtained from the patient. They were informed they may decline to participate or withdraw from participation in pharmacy services at any time.          [1]   Allergies  Allergen Reactions    Ciprofloxacin Other, Hallucinations and Unknown     Confusion (other)    Nsaids (Non-Steroidal Anti-Inflammatory Drug) GI bleeding     Contraindicated

## 2025-05-22 ENCOUNTER — APPOINTMENT (OUTPATIENT)
Dept: PHARMACY | Facility: HOSPITAL | Age: 65
End: 2025-05-22
Payer: MEDICARE

## 2025-05-22 DIAGNOSIS — E11.9 TYPE 2 DIABETES MELLITUS TREATED WITHOUT INSULIN (MULTI): ICD-10-CM

## 2025-05-22 RX ORDER — DULAGLUTIDE 0.75 MG/.5ML
0.75 INJECTION, SOLUTION SUBCUTANEOUS
Qty: 2 ML | Refills: 0 | Status: SHIPPED | OUTPATIENT
Start: 2025-05-25

## 2025-05-22 NOTE — PROGRESS NOTES
Clinical Pharmacy Appointment    Patient ID: Mellissa Aguirre is a 64 y.o. female who presents for Diabetes.      Referring Provider/PCP: Hao Benz DO  Last visit with PCP: 4/24/25   Next visit with PCP: 7/25/25    Subjective   Interval history:  Since last visit, started Trulicity 0.75mg weekly  5 lb weight loss in past 3 weeks   She stopped metformin 1 week ago due to recurrence of joint pain and stiffness with resuming therapy. She never started statin - prefers to wait until a few months on Trulicity and repeat lipid panel.  Watching carb intake more and effect on glucose - limiting pasta   Mild nausea after dose other denies GI side effects. Normal bowel movements. At baseline had loose stool with lactose products  Energy improved, sleeping better    DIABETES MELLITUS TYPE II:    Diagnosed with diabetes: At least 2019.   Known diabetic complications: None.  Does patient follow with Endocrinology: No  Last optometry exam: 3 years ago  Most recent visit in Podiatry: PCP does     Current diabetic medications include:  Metformin XR 1000 mg once daily - self stopped  Trulicity 0.75mg once weekly (Sunday)    Clarifications to above regimen: S/e at higher metformin dosing  Adverse Effects: None    Past diabetic medications include:  None    Glucose Readings:  Glucometer/CGM Type: Glucometer  Patient tests BG 1 times per day    Current home BG readings (mg/dL): 110-117 postprandial, AM -188  Previous home BG readings (mg/dL): 220-260 AM FBG, Afternoon 150-180     Any episodes of hypoglycemia? No,  .  Did patient treat episode of hypoglycemia appropriately? N/A  Does the patient have a prescription for ready-to-use Glucagon? Not on insulin    Lifestyle:  Diet: 1 large meal per day at baseline - since starting Trulicity started having small meal in morning   BK: Rg and egg cheese biscuit  LN: skips or small snack like beefstick   DN: meat and veg   Snacks: carrots, celery, radish with ranch, or  nuts   Drinks: 2 bottles of water per day  Exercise: does not exercise  Used to swim but stopped. Just joined  Tobacco history: Never    Secondary Prevention:  Statin? No - never started statin   ACE-I/ARB? No  Aspirin? No    Pertinent PMH Review:  PMH of Pancreatitis: No  PMH of Retinopathy: No  PMH of Urinary Tract Infections: No  PMH of MTC: No  PMH of MEN2: No  UACR/EGFR in last year?: Yes  Albumin/Creatinine Ratio   Date Value Ref Range Status   01/20/2025 20.7 <30.0 ug/mg Creat Final       Immunizations:  Influenza? No  COVID? No  Pneumonia? No  Shingles? No  Hepatitis B? No     Medication Reconciliation:  Changed:   Added:   Discontinued:     Drug Interactions  No relevant drug interactions were noted.    Medication System Management  Patient's preferred pharmacy: [facility]  Adherence/Organization: No issues reported  Affordability/Accessibility: Trulicity $231- Ineligible for  PAP due to income reported >400%    Objective   Allergies[1]  Social History     Social History Narrative    Not on file      Medication Review  Current Outpatient Medications   Medication Instructions    anastrozole (Arimidex) 1 mg tablet 1 tablet, Daily    cholecalciferol (Vitamin D-3) 50 MCG (2000 UT) tablet 1 tablet, Daily    cod liver oiL oil Take by mouth.    cyanocobalamin (Vitamin B-12) 1,000 mcg tablet 1 tablet, Daily    rosuvastatin (CRESTOR) 10 mg, oral, Daily    thyroid (pork) (ARMOUR THYROID) 30 mg, oral, Daily before breakfast    thyroid (pork) (ARMOUR THYROID) 15 mg, oral, Daily before breakfast    [START ON 5/25/2025] Trulicity 0.75 mg, subcutaneous, Once Weekly    zoledronic acid (ZOMETA IV) Infuse into a venous catheter.      Vitals  BP Readings from Last 2 Encounters:   04/24/25 146/84   03/07/25 169/80     BMI Readings from Last 1 Encounters:   04/24/25 40.37 kg/m²      Labs  Lab Results   Component Value Date    HGBA1C 11.2 (H) 04/23/2025    HGBA1C 9.8 (A) 01/17/2025    HGBA1C 6.5 (H) 09/10/2020     Lab Results    Component Value Date    CALCIUM 8.7 03/07/2025     (L) 03/07/2025    K 3.7 03/07/2025    CO2 23 03/07/2025     03/07/2025    BUN 18 03/07/2025    CREATININE 0.65 03/07/2025    EGFR >90 03/07/2025     Lab Results   Component Value Date    ALT 64 (H) 03/07/2025    AST 55 (H) 03/07/2025    ALKPHOS 91 03/07/2025    BILITOT 0.4 03/07/2025     Lab Results   Component Value Date    TRIG 284 (H) 04/23/2025    CHOL 219 (H) 04/23/2025    LDLCALC 118 (H) 04/23/2025    HDL 57 04/23/2025     Lab Results   Component Value Date    MICROALBCREA 20.7 01/20/2025     Wt Readings from Last 3 Encounters:   04/24/25 107 kg (235 lb 3.2 oz)   03/07/25 113 kg (250 lb)   01/17/25 109 kg (239 lb 12.8 oz)      There is no height or weight on file to calculate BMI.     Assessment/Plan   Problem List Items Addressed This Visit       Type 2 diabetes mellitus treated without insulin (Multi)    Relevant Medications    dulaglutide (Trulicity) 0.75 mg/0.5 mL pen injector (Start on 5/25/2025)    Other Relevant Orders    Referral to Clinical Pharmacy     Patient's goal A1c is < 7%.  Is pt at goal? No, 11.2%  Patient's SMBGs are improving.       Medication Changes:  CONTINUE  Trulicity 0.75mg subcutaneous once weekly   Adequate glycemic, weight loss response and tolerance - prefers to maintain dosing for additional month   Recent implementation of diet changes- reducing carb intake, increasing protein   STOP  Metformin XR (patient stopped due to persistent joint side effects)    Future Considerations:  Recommended podiatry and eye exam - she plans to schedule, cost barrier previously   LDL > goal of 70, Statin recommended - patient prefers to trial Trulicity and lifestyle changes for a few months and repeat lipid panel     Monitoring and Education:  Counseled patient on Trulicity MOA, expectations, side effects, duration of therapy, administration, and monitoring parameters.  Provided detailed dosing and administration counseling to ensure  proper technique.   Reviewed Trulicity titration schedule, starting with 0.75 mg once weekly to 1.5 mg, 3 mg, and if tolerated 4.5 mg.  Counseled patient on the benefits of GLP-1ra, such as cardiovascular risk reduction, glycemic control, and weight loss potential.  Reviewed storage requirements of Trulicity when not in use, and when to administer the medication if a dose is missed.  Advised patient that they may experience improved satiety after meals and portion sizes of meals may be reduced as doses of Trulicity increase.     Clinical Pharmacist follow-up: 6/19 11 AM, Telehealth visit    Patient is not followed in [facility].    Continue all meds under the continuation of care with the referring provider and clinical pharmacy team.    Thank you,  Kimmy Mensah, PharmD  Clinical Pharmacy Specialist Primary Care  [phone]     Verbal consent to manage patient's drug therapy was obtained from the patient. They were informed they may decline to participate or withdraw from participation in pharmacy services at any time.          [1]   Allergies  Allergen Reactions    Ciprofloxacin Other, Hallucinations and Unknown     Confusion (other)    Nsaids (Non-Steroidal Anti-Inflammatory Drug) GI bleeding     Contraindicated

## 2025-06-19 ENCOUNTER — APPOINTMENT (OUTPATIENT)
Dept: PHARMACY | Facility: HOSPITAL | Age: 65
End: 2025-06-19
Payer: MEDICARE

## 2025-06-19 DIAGNOSIS — E11.9 TYPE 2 DIABETES MELLITUS TREATED WITHOUT INSULIN (MULTI): ICD-10-CM

## 2025-06-19 RX ORDER — DULAGLUTIDE 1.5 MG/.5ML
1.5 INJECTION, SOLUTION SUBCUTANEOUS WEEKLY
Qty: 2 ML | Refills: 1 | Status: SHIPPED | OUTPATIENT
Start: 2025-06-19
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
## 2025-07-07 ENCOUNTER — TELEMEDICINE (OUTPATIENT)
Dept: PHARMACY | Facility: HOSPITAL | Age: 65
End: 2025-07-07
Payer: MEDICARE

## 2025-07-07 ENCOUNTER — TELEPHONE (OUTPATIENT)
Dept: PRIMARY CARE | Facility: CLINIC | Age: 65
End: 2025-07-07
Payer: MEDICARE

## 2025-07-07 DIAGNOSIS — E11.9 TYPE 2 DIABETES MELLITUS TREATED WITHOUT INSULIN (MULTI): Primary | ICD-10-CM

## 2025-07-07 DIAGNOSIS — E11.9 TYPE 2 DIABETES MELLITUS TREATED WITHOUT INSULIN (MULTI): ICD-10-CM

## 2025-07-07 RX ORDER — DULAGLUTIDE 0.75 MG/.5ML
0.75 INJECTION, SOLUTION SUBCUTANEOUS
Qty: 2 ML | Refills: 0 | Status: SHIPPED | OUTPATIENT
Start: 2025-07-13
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
## 2025-07-07 NOTE — TELEPHONE ENCOUNTER
Patient dosage for trulicity was increased to 1.5mg from 0.75mg last week. Patient states that she experienced intense back pain after taking this higher dose and it increased her BGL. Patient wants to go back down to 0.75mg dosage. She said that she tried to call clinical pharmacy back and they told her that she had the wrong number. Patient was due for meds yesterday.    [facility]

## 2025-07-17 ENCOUNTER — APPOINTMENT (OUTPATIENT)
Dept: PHARMACY | Facility: HOSPITAL | Age: 65
End: 2025-07-17
Payer: MEDICARE

## 2025-07-25 ENCOUNTER — PATIENT MESSAGE (OUTPATIENT)
Dept: PRIMARY CARE | Facility: CLINIC | Age: 65
End: 2025-07-25
Payer: MEDICARE

## 2025-07-25 ENCOUNTER — APPOINTMENT (OUTPATIENT)
Dept: PRIMARY CARE | Facility: CLINIC | Age: 65
End: 2025-07-25
Payer: MEDICARE

## 2025-07-25 LAB
ALBUMIN SERPL-MCNC: 4.4 G/DL (ref 3.6–5.1)
ALP SERPL-CCNC: 100 U/L (ref 37–153)
ALT SERPL-CCNC: 36 U/L (ref 6–29)
ANION GAP SERPL CALCULATED.4IONS-SCNC: 12 MMOL/L (CALC) (ref 7–17)
AST SERPL-CCNC: 24 U/L (ref 10–35)
BILIRUB SERPL-MCNC: 0.6 MG/DL (ref 0.2–1.2)
BUN SERPL-MCNC: 17 MG/DL (ref 7–25)
CALCIUM SERPL-MCNC: 9.4 MG/DL (ref 8.6–10.4)
CHLORIDE SERPL-SCNC: 102 MMOL/L (ref 98–110)
CHOLEST SERPL-MCNC: 227 MG/DL
CHOLEST/HDLC SERPL: 4.1 (CALC)
CO2 SERPL-SCNC: 24 MMOL/L (ref 20–32)
CREAT SERPL-MCNC: 0.61 MG/DL (ref 0.5–1.05)
EGFRCR SERPLBLD CKD-EPI 2021: 99 ML/MIN/1.73M2
EST. AVERAGE GLUCOSE BLD GHB EST-MCNC: 246 MG/DL
EST. AVERAGE GLUCOSE BLD GHB EST-SCNC: 13.6 MMOL/L
GLUCOSE SERPL-MCNC: 212 MG/DL (ref 65–99)
HBA1C MFR BLD: 10.2 %
HDLC SERPL-MCNC: 56 MG/DL
LDLC SERPL CALC-MCNC: 127 MG/DL (CALC)
NONHDLC SERPL-MCNC: 171 MG/DL (CALC)
POTASSIUM SERPL-SCNC: 4.5 MMOL/L (ref 3.5–5.3)
PROT SERPL-MCNC: 6.8 G/DL (ref 6.1–8.1)
SODIUM SERPL-SCNC: 138 MMOL/L (ref 135–146)
T4 FREE SERPL-MCNC: 1 NG/DL (ref 0.8–1.8)
TRIGL SERPL-MCNC: 288 MG/DL
TSH SERPL-ACNC: 5.33 MIU/L (ref 0.4–4.5)

## 2025-08-02 DIAGNOSIS — E11.9 TYPE 2 DIABETES MELLITUS TREATED WITHOUT INSULIN (MULTI): ICD-10-CM

## 2025-08-02 RX ORDER — DULAGLUTIDE 0.75 MG/.5ML
INJECTION, SOLUTION SUBCUTANEOUS
Qty: 2 ML | Refills: 0 | Status: SHIPPED | OUTPATIENT
Start: 2025-08-02

## 2025-08-07 ENCOUNTER — APPOINTMENT (OUTPATIENT)
Dept: PHARMACY | Facility: HOSPITAL | Age: 65
End: 2025-08-07
Payer: MEDICARE

## 2025-08-07 DIAGNOSIS — E11.9 TYPE 2 DIABETES MELLITUS TREATED WITHOUT INSULIN (MULTI): Primary | ICD-10-CM

## 2025-08-07 NOTE — PROGRESS NOTES
Clinical Pharmacy Appointment    Patient ID: Mellissa Aguirre is a 64 y.o. female who presents for No chief complaint on file..      Referring Provider/PCP: Hao Benz,   Last visit with PCP: 4/24/25   Next visit with PCP: 8/12/25    Subjective   Interval history:  Since last visit, repeat A1c 10.2% (from 11.2%). Trulicity dose was reduced to 0.75mg weekly due to severe back pain after one dose of Trulicity 1.5mg. States back pain developed while on metformin in past as well. Back pain has resolved. Hesistant to make any med changes due to med sensitivities. States yesterday has felt the best she has in a long time.     DIABETES MELLITUS TYPE II:    Diagnosed with diabetes: At least 2019.   Known diabetic complications: None.  Does patient follow with Endocrinology: No  Last optometry exam: 3 years ago  Most recent visit in Podiatry: PCP does     Current diabetic medications include:  Trulicity 0.75mg once weekly (Sunday)    Adverse Effects: back pain     Past diabetic medications include:  Metformin XR 1000 mg once daily - self stopped due to joint pain    Glucose Readings:  Glucometer/CGM Type: Glucometer  Patient tests BG 1 times per day    Current home BG readings (mg/dL): AM -180    Previous home BG readings (mg/dL): AM -200    Any episodes of hypoglycemia? No,  .  Did patient treat episode of hypoglycemia appropriately? N/A  Does the patient have a prescription for ready-to-use Glucagon? Not on insulin    Lifestyle:  Diet: 1 large meal per day at baseline - since starting Trulicity started having small meal in morning   BK: Rg and egg cheese biscuit  LN: skips or small snack like beefstick   DN: meat and veg   Snacks: carrots, celery, radish with ranch, or nuts   Drinks: 2 bottles of water per day  Exercise: does not exercise  Used to swim but stopped. Just joined  Tobacco history: Never    Secondary Prevention:  Statin? No - never started statin   ACE-I/ARB? No  Aspirin?  No    Pertinent PMH Review:  PMH of Pancreatitis: No  PMH of Retinopathy: No  PMH of Urinary Tract Infections: No  PMH of MTC: No  PMH of MEN2: No  UACR/EGFR in last year?: Yes  Albumin/Creatinine Ratio   Date Value Ref Range Status   01/20/2025 20.7 <30.0 ug/mg Creat Final       Immunizations:  Influenza? No  COVID? No  Pneumonia? No  Shingles? No  Hepatitis B? No     Medication Reconciliation:  Changed:   Added:   Discontinued:     Drug Interactions  No relevant drug interactions were noted.    Medication System Management  Patient's preferred pharmacy: [facility]  Adherence/Organization: No issues reported  Affordability/Accessibility: Trulicity $231- Ineligible for  PAP due to income reported >400%    Objective   Allergies[1]  Social History     Social History Narrative    Not on file      Medication Review  Current Outpatient Medications   Medication Instructions    anastrozole (Arimidex) 1 mg tablet 1 tablet, Daily    cholecalciferol (Vitamin D-3) 50 MCG (2000 UT) tablet 1 tablet, Daily    cod liver oiL oil Take by mouth.    cyanocobalamin (Vitamin B-12) 1,000 mcg tablet 1 tablet, Daily    rosuvastatin (CRESTOR) 10 mg, oral, Daily    thyroid (pork) (ARMOUR THYROID) 30 mg, oral, Daily before breakfast    thyroid (pork) (ARMOUR THYROID) 15 mg, oral, Daily before breakfast    Trulicity 0.75 mg/0.5 mL pen injector INJECT 0.75MG UNDER THE SKIN 1 TIME PER WEEK    zoledronic acid (ZOMETA IV) Infuse into a venous catheter.      Vitals  BP Readings from Last 2 Encounters:   04/24/25 146/84   03/07/25 169/80     BMI Readings from Last 1 Encounters:   04/24/25 40.37 kg/m²      Labs  Lab Results   Component Value Date    HGBA1C 10.2 (H) 07/24/2025    HGBA1C 11.2 (H) 04/23/2025    HGBA1C 9.8 (A) 01/17/2025     Lab Results   Component Value Date    CALCIUM 9.4 07/24/2025     07/24/2025    K 4.5 07/24/2025    CO2 24 07/24/2025     07/24/2025    BUN 17 07/24/2025    CREATININE 0.61 07/24/2025    EGFR 99 07/24/2025      Lab Results   Component Value Date    ALT 36 (H) 07/24/2025    AST 24 07/24/2025    ALKPHOS 100 07/24/2025    BILITOT 0.6 07/24/2025     Lab Results   Component Value Date    TRIG 288 (H) 07/24/2025    CHOL 227 (H) 07/24/2025    LDLCALC 127 (H) 07/24/2025    HDL 56 07/24/2025     Lab Results   Component Value Date    MICROALBCREA 20.7 01/20/2025     Wt Readings from Last 3 Encounters:   04/24/25 107 kg (235 lb 3.2 oz)   03/07/25 113 kg (250 lb)   01/17/25 109 kg (239 lb 12.8 oz)      There is no height or weight on file to calculate BMI.     Assessment/Plan   Problem List Items Addressed This Visit    None        Patient's goal A1c is < 7%.  Is pt at goal? No, 10.2%  Patient's SMBGs are improving however remain above goal. She is hesitant to make medication changes at this time and prefers lifestyle measures. However she acknowledges that if blood sugars remain elevated she will need additional therapy.    Medication Changes:  CONTINUE  Trulicity 0.75 mg subcutaneous once weekly   Great work on diet changes so far. Continue work on reducing simple carbs, increasing protein and meal prepping to save time later in the week when schedule is busy.    Future Considerations:  Due to side effects limiting Trulicity dose escalation, discussed trying alternate such as Mounjaro. She prefers to continue with Trulicity for now and will re-evaluate other options pending PCP follow up in a couple weeks.  Recommended podiatry and eye exam - she plans to schedule, cost barrier previously   LDL > goal of 70, Statin recommended - patient prefers to trial Trulicity and lifestyle changes for a few months and repeat lipid panel     Monitoring and Education:  Counseled patient on Trulicity MOA, expectations, side effects, duration of therapy, administration, and monitoring parameters.  Provided detailed dosing and administration counseling to ensure proper technique.   Reviewed Trulicity titration schedule, starting with 0.75 mg once  weekly to 1.5 mg, 3 mg, and if tolerated 4.5 mg.  Counseled patient on the benefits of GLP-1ra, such as cardiovascular risk reduction, glycemic control, and weight loss potential.  Reviewed storage requirements of Trulicity when not in use, and when to administer the medication if a dose is missed.  Advised patient that they may experience improved satiety after meals and portion sizes of meals may be reduced as doses of Trulicity increase.     Blood sugar goals  - Fastin - 130 mg/dL  - 2 hours after meal: less than 180 mg/dL  - A1c: less than 7%     Clinical Pharmacist follow-up:  11 AM, Telehealth visit  Patient is not followed in [facility].    Continue all meds under the continuation of care with the referring provider and clinical pharmacy team.    Thank you,  Kimmy Mensah, PharmD  Clinical Pharmacy Specialist Primary Care  [phone]     Verbal consent to manage patient's drug therapy was obtained from the patient. They were informed they may decline to participate or withdraw from participation in pharmacy services at any time.          [1]   Allergies  Allergen Reactions    Ciprofloxacin Other, Hallucinations and Unknown     Confusion (other)    Nsaids (Non-Steroidal Anti-Inflammatory Drug) GI bleeding     Contraindicated

## 2025-08-12 ENCOUNTER — OFFICE VISIT (OUTPATIENT)
Dept: PRIMARY CARE | Facility: CLINIC | Age: 65
End: 2025-08-12
Payer: MEDICARE

## 2025-08-12 VITALS
BODY MASS INDEX: 39.51 KG/M2 | OXYGEN SATURATION: 96 % | SYSTOLIC BLOOD PRESSURE: 158 MMHG | DIASTOLIC BLOOD PRESSURE: 92 MMHG | TEMPERATURE: 97.8 F | HEART RATE: 79 BPM | WEIGHT: 230.2 LBS

## 2025-08-12 DIAGNOSIS — C50.912 MALIGNANT NEOPLASM OF LEFT FEMALE BREAST, UNSPECIFIED ESTROGEN RECEPTOR STATUS, UNSPECIFIED SITE OF BREAST: ICD-10-CM

## 2025-08-12 DIAGNOSIS — E78.2 MIXED HYPERLIPIDEMIA: ICD-10-CM

## 2025-08-12 DIAGNOSIS — E11.9 TYPE 2 DIABETES MELLITUS TREATED WITHOUT INSULIN (MULTI): Primary | ICD-10-CM

## 2025-08-12 DIAGNOSIS — E03.9 HYPOTHYROIDISM, UNSPECIFIED TYPE: ICD-10-CM

## 2025-08-12 PROCEDURE — 99214 OFFICE O/P EST MOD 30 MIN: CPT | Performed by: FAMILY MEDICINE

## 2025-08-12 PROCEDURE — 1126F AMNT PAIN NOTED NONE PRSNT: CPT | Performed by: FAMILY MEDICINE

## 2025-08-12 PROCEDURE — G2211 COMPLEX E/M VISIT ADD ON: HCPCS | Performed by: FAMILY MEDICINE

## 2025-08-12 PROCEDURE — 3080F DIAST BP >= 90 MM HG: CPT | Performed by: FAMILY MEDICINE

## 2025-08-12 PROCEDURE — 1036F TOBACCO NON-USER: CPT | Performed by: FAMILY MEDICINE

## 2025-08-12 PROCEDURE — 3077F SYST BP >= 140 MM HG: CPT | Performed by: FAMILY MEDICINE

## 2025-08-12 PROCEDURE — 3046F HEMOGLOBIN A1C LEVEL >9.0%: CPT | Performed by: FAMILY MEDICINE

## 2025-08-12 PROCEDURE — 1160F RVW MEDS BY RX/DR IN RCRD: CPT | Performed by: FAMILY MEDICINE

## 2025-08-12 PROCEDURE — 1159F MED LIST DOCD IN RCRD: CPT | Performed by: FAMILY MEDICINE

## 2025-08-12 RX ORDER — THYROID 15 MG/1
15 TABLET ORAL
Qty: 90 TABLET | Refills: 1 | Status: SHIPPED | OUTPATIENT
Start: 2025-08-12 | End: 2026-08-12

## 2025-08-12 RX ORDER — THYROID 30 MG/1
30 TABLET ORAL
Qty: 90 TABLET | Refills: 1 | Status: SHIPPED | OUTPATIENT
Start: 2025-08-12

## 2025-08-12 RX ORDER — METFORMIN HYDROCHLORIDE 500 MG/1
500 TABLET, EXTENDED RELEASE ORAL
COMMUNITY

## 2025-08-12 ASSESSMENT — LIFESTYLE VARIABLES
HOW MANY STANDARD DRINKS CONTAINING ALCOHOL DO YOU HAVE ON A TYPICAL DAY: PATIENT DOES NOT DRINK
HOW OFTEN DO YOU HAVE SIX OR MORE DRINKS ON ONE OCCASION: NEVER
AUDIT-C TOTAL SCORE: 0
HOW OFTEN DO YOU HAVE A DRINK CONTAINING ALCOHOL: NEVER
SKIP TO QUESTIONS 9-10: 1

## 2025-08-12 ASSESSMENT — PATIENT HEALTH QUESTIONNAIRE - PHQ9
2. FEELING DOWN, DEPRESSED OR HOPELESS: NOT AT ALL
1. LITTLE INTEREST OR PLEASURE IN DOING THINGS: NOT AT ALL
SUM OF ALL RESPONSES TO PHQ9 QUESTIONS 1 & 2: 0

## 2025-08-12 ASSESSMENT — ENCOUNTER SYMPTOMS
OCCASIONAL FEELINGS OF UNSTEADINESS: 0
DEPRESSION: 0
LOSS OF SENSATION IN FEET: 0

## 2025-08-12 ASSESSMENT — PAIN SCALES - GENERAL: PAINLEVEL_OUTOF10: 0-NO PAIN

## 2025-08-27 ENCOUNTER — TELEPHONE (OUTPATIENT)
Dept: PRIMARY CARE | Facility: CLINIC | Age: 65
End: 2025-08-27
Payer: MEDICARE

## 2025-08-27 DIAGNOSIS — E11.9 TYPE 2 DIABETES MELLITUS TREATED WITHOUT INSULIN (MULTI): ICD-10-CM

## 2025-08-28 ENCOUNTER — TELEPHONE (OUTPATIENT)
Dept: PRIMARY CARE | Facility: CLINIC | Age: 65
End: 2025-08-28

## 2025-08-28 ENCOUNTER — APPOINTMENT (OUTPATIENT)
Dept: PHARMACY | Facility: HOSPITAL | Age: 65
End: 2025-08-28
Payer: MEDICARE

## 2025-08-28 DIAGNOSIS — E11.9 TYPE 2 DIABETES MELLITUS TREATED WITHOUT INSULIN (MULTI): ICD-10-CM

## 2025-08-28 RX ORDER — DULAGLUTIDE 0.75 MG/.5ML
INJECTION, SOLUTION SUBCUTANEOUS
Qty: 2 ML | Refills: 2 | Status: SHIPPED | OUTPATIENT
Start: 2025-08-28

## 2025-10-30 ENCOUNTER — APPOINTMENT (OUTPATIENT)
Dept: PHARMACY | Facility: HOSPITAL | Age: 65
End: 2025-10-30
Payer: MEDICARE